# Patient Record
Sex: MALE | Race: BLACK OR AFRICAN AMERICAN
[De-identification: names, ages, dates, MRNs, and addresses within clinical notes are randomized per-mention and may not be internally consistent; named-entity substitution may affect disease eponyms.]

---

## 2021-10-04 ENCOUNTER — HOSPITAL ENCOUNTER (EMERGENCY)
Dept: HOSPITAL 97 - ER | Age: 36
Discharge: HOME | End: 2021-10-04
Payer: OTHER GOVERNMENT

## 2021-10-04 VITALS — OXYGEN SATURATION: 96 % | DIASTOLIC BLOOD PRESSURE: 57 MMHG | SYSTOLIC BLOOD PRESSURE: 113 MMHG

## 2021-10-04 VITALS — TEMPERATURE: 98.3 F

## 2021-10-04 DIAGNOSIS — I10: ICD-10-CM

## 2021-10-04 DIAGNOSIS — R06.2: ICD-10-CM

## 2021-10-04 DIAGNOSIS — Z20.822: ICD-10-CM

## 2021-10-04 DIAGNOSIS — J06.9: Primary | ICD-10-CM

## 2021-10-04 LAB
BUN BLD-MCNC: 9 MG/DL (ref 7–18)
CRP SERPL-MCNC: 20.8 MG/L (ref ?–3)
FERRITIN SERPL-MCNC: 645.5 NG/ML (ref 26–388)
GLUCOSE SERPLBLD-MCNC: 106 MG/DL (ref 74–106)
HCT VFR BLD CALC: 38.4 % (ref 39.6–49)
INR BLD: 1.05
LYMPHOCYTES # SPEC AUTO: 3.1 K/UL (ref 0.7–4.9)
PMV BLD: 7.9 FL (ref 7.6–11.3)
POTASSIUM SERPL-SCNC: 3.4 MMOL/L (ref 3.5–5.1)
RBC # BLD: 4.78 M/UL (ref 4.33–5.43)
TROPONIN (EMERG DEPT USE ONLY): 0.04 NG/ML (ref 0–0.04)

## 2021-10-04 PROCEDURE — 84484 ASSAY OF TROPONIN QUANT: CPT

## 2021-10-04 PROCEDURE — 87081 CULTURE SCREEN ONLY: CPT

## 2021-10-04 PROCEDURE — 84145 PROCALCITONIN (PCT): CPT

## 2021-10-04 PROCEDURE — 83605 ASSAY OF LACTIC ACID: CPT

## 2021-10-04 PROCEDURE — 82728 ASSAY OF FERRITIN: CPT

## 2021-10-04 PROCEDURE — 87070 CULTURE OTHR SPECIMN AEROBIC: CPT

## 2021-10-04 PROCEDURE — 87804 INFLUENZA ASSAY W/OPTIC: CPT

## 2021-10-04 PROCEDURE — 36415 COLL VENOUS BLD VENIPUNCTURE: CPT

## 2021-10-04 PROCEDURE — 86140 C-REACTIVE PROTEIN: CPT

## 2021-10-04 PROCEDURE — 99284 EMERGENCY DEPT VISIT MOD MDM: CPT

## 2021-10-04 PROCEDURE — 85730 THROMBOPLASTIN TIME PARTIAL: CPT

## 2021-10-04 PROCEDURE — 85025 COMPLETE CBC W/AUTO DIFF WBC: CPT

## 2021-10-04 PROCEDURE — 85610 PROTHROMBIN TIME: CPT

## 2021-10-04 PROCEDURE — 87040 BLOOD CULTURE FOR BACTERIA: CPT

## 2021-10-04 PROCEDURE — 80048 BASIC METABOLIC PNL TOTAL CA: CPT

## 2021-10-04 PROCEDURE — 93005 ELECTROCARDIOGRAM TRACING: CPT

## 2021-10-04 PROCEDURE — 71045 X-RAY EXAM CHEST 1 VIEW: CPT

## 2021-10-04 NOTE — RAD REPORT
EXAM DESCRIPTION:  Jasson Single View10/4/2021 7:49 am

 

CLINICAL HISTORY:  Cough

 

COMPARISON:  none

 

FINDINGS:   The lungs appear clear of acute infiltrate. The heart is normal size

 

IMPRESSION:   No acute abnormalities displayed

## 2021-10-04 NOTE — EDPHYS
Physician Documentation                                                                           

 [facility]                                                                 

Name: Jason Mireles                                                                              

Age: 35 yrs                                                                                       

Sex: Male                                                                                         

: 1985                                                                                   

MRN: [id number]                                                                                   

Arrival Date: 10/04/2021                                                                          

Time: 07:15                                                                                       

Account#: [id number]                                                                            

Bed 5                                                                                             

Private MD:                                                                                       

ED Physician Dion Hollis                                                                         

HPI:                                                                                              

10/04                                                                                             

07:30 This 35 yrs old Black Male presents to ER via EMS with complaints of Shortness of       rn  

      breath.                                                                                     

07:30 The patient has shortness of breath at rest, with light activity. Onset: The            rn  

      symptoms/episode began/occurred 2 day(s) ago. Duration: The symptoms are intermittent.      

      The patient's shortness of breath is aggravated by exertion, light activity, is             

      alleviated by nothing. Associated signs and symptoms: Pertinent positives:                  

      non-productive cough, Pertinent negatives: chest pain, fever, hemoptysis. Severity of       

      symptoms: At their worst the symptoms were mild in the emergency department the             

      symptoms are unchanged. The patient has not experienced similar symptoms in the past.       

      The patient has not recently seen a physician. and nasal congestion for 2 days, sore        

      throat, shortness of breath that is worse with exertion. No fever. Not Covid                

      vaccinated. No known sick contacts. Is an active smoker. Denies hemoptysis or chest         

      pain. Also with mild diarrhea.                                                              

                                                                                                  

Historical:                                                                                       

- Allergies:                                                                                      

07:18 No Known Allergies;                                                                     ap3 

- Home Meds:                                                                                      

07:18 Lisinopril Oral [Active]; Hydrochlorothiazide Oral [Active]; Allopurinol Oral [Active]; ap3 

- PMHx:                                                                                           

07:18 Gout; Hypertensive disorder;                                                            ap3 

                                                                                                  

- Immunization history:: Adult Immunizations up to date.                                          

- Social history:: Smoking status: Patient reports the use of cigarette tobacco                   

  products, denies chronic smoking, but will smoke occasionally.                                  

- Family history:: not pertinent.                                                                 

- Hospitalizations: : No recent hospitalization is reported.                                      

                                                                                                  

                                                                                                  

ROS:                                                                                              

07:30 Constitutional: Negative for fever, chills, and weight loss, Eyes: Negative for injury, rn  

      pain, redness, and discharge, ENT: Positive for nasal congestion and sore throat Neck:      

      Negative for injury, pain, and swelling, Cardiovascular: Negative for chest pain,           

      palpitations, and edema, Respiratory: Positive for cough and shortness of breath            

      Abdomen/GI: Positive for diarrhea, negative for abdominal pain : Negative for injury,     

      bleeding, discharge, and swelling, MS/Extremity: Negative for injury and deformity,         

      Skin: Negative for injury, rash, and discoloration, Neuro: Negative for headache,           

      weakness, numbness, tingling, and seizure.                                                  

07:30 All other systems are negative.                                                             

                                                                                                  

Exam:                                                                                             

07:30 Constitutional:  Overweight male with audible wheezing Head/Face:  Normocephalic,       rn  

      atraumatic. Eyes:  Periorbital areas with no swelling, redness, or edema. ENT:  no          

      stridor Neck:  Trachea midline, no crepitus, no masses, full range of motion without        

      nuchal rigidity, or vertebral point tenderness.  No Meningismus. Cardiovascular:            

      Regular rate and rhythm.  No pulse deficits. Respiratory:  Mild tachypnea with audible      

      wheezing bilaterally Abdomen/GI:  Soft, non-tender Skin:  Warm, dry, no cyanosis MS/        

      Extremity:  Pulses equal, no cyanosis.  Neurovascular intact.  Full, normal range of        

      motion.  Equal circumference. Neuro:  Awake and alert, GCS 15, oriented to person,          

      place, time, and situation.  Cranial nerves II-XII grossly intact.  Motor strength 5/5      

      in all extremities.  Sensory grossly intact.  Cerebellar exam normal.  Normal gait.         

07:45 ECG was reviewed by the Attending Physician.                                            rn  

                                                                                                  

Vital Signs:                                                                                      

07:15  / 91; Pulse 85; Resp 18; Temp 98.3(O); Pulse Ox 95% ; Weight 142.88 kg; Height 6 ap3 

      ft. 1 in. (185.42 cm);                                                                      

08:14  / 75; Pulse 103; Resp 18; Pulse Ox 99% on R/A;                                   ap3 

08:57  / 57; Pulse 78; Resp 17; Pulse Ox 96% on R/A;                                    ap3 

07:15 Body Mass Index 41.56 (142.88 kg, 185.42 cm)                                            ap3 

                                                                                                  

MDM:                                                                                              

07:15 Patient medically screened.                                                             rn  

08:41 Data interpreted: Cardiac monitor: rate is 102 beats/min, rhythm is sinus tachycardia,  rn  

      with no ectopy, Interpretation: tachycardia, Pulse oximetry: on room air is 99 %.           

      Interpretation: normal. Test interpretation: by ED physician or midlevel provider: ECG,     

      plain radiologic studies, Chest x-ray negative for acute pneumonia or pneumothorax.         

09:22 Differential diagnosis: Bronchitis Myocardial Infarction pneumonia, Pneumothorax        rn  

      pulmonary edema, reactive airway disease, COVID, Flu, strep. Data reviewed: vital           

      signs, nurses notes, lab test result(s), EKG, radiologic studies, plain films, and as a     

      result, I will discharge patient. Counseling: I had a detailed discussion with the          

      patient and/or guardian regarding: the historical points, exam findings, and any            

      diagnostic results supporting the discharge/admit diagnosis, lab results, radiology         

      results, the need for outpatient follow up, to return to the emergency department if        

      symptoms worsen or persist or if there are any questions or concerns that arise at          

      home. Response to treatment: the patient's symptoms have markedly improved after            

      treatment, and as a result, I will discharge patient. Special discussion: I discussed       

      with the patient/guardian in detail that at this point there is no indication for           

      admission to the hospital. It is understood, however, that if the symptoms persist or       

      worsen the patient needs to return immediately for re-evaluation. ED course: Patient        

      improved, no oxygen requirement. Chest x-ray negative for pneumonia. Covid/flu/strep        

      all negative. States breathing is much improved. Will DC home with                          

      antibiotics/inhaler/steroids..                                                              

                                                                                                  

10/04                                                                                             

07:16 Order name: BMP                                                                         rn  

10/04                                                                                             

07:16 Order name: Blood Culture Adult (2)                                                     rn  

10/04                                                                                             

07:16 Order name: C-Reactive Protein                                                          rn  

10/04                                                                                             

07:16 Order name: CBC with Diff                                                               rn  

10/04                                                                                             

07:16 Order name: Ferritin; Complete Time: 08:19                                              rn  

10/04                                                                                             

07:16 Order name: Flu; Complete Time: 08:41                                                   rn  

10/04                                                                                             

07:16 Order name: Lactate; Complete Time: 08:                                               rn  

10/04                                                                                             

07:16 Order name: PT-INR; Complete Time: 08:19                                                rn  

10/04                                                                                             

07:16 Order name: Procalcitonin; Complete Time: 08:19                                         rn  

10/04                                                                                             

07:16 Order name: Ptt, Activated; Complete Time: 08:19                                        rn  

10/04                                                                                             

07:16 Order name: Strep; Complete Time: 08:41                                                 rn  

10/04                                                                                             

07:16 Order name: Troponin (emerg Dept Use Only); Complete Time: 08:19                        rn  

10/04                                                                                             

07:16 Order name: Basic Metabolic Panel; Complete Time: 08:19                                 EDMS

10/04                                                                                             

07:16 Order name: CXR XRAY; Complete Time: 08:41                                              rn  

10/04                                                                                             

07:16 Order name: EKG; Complete Time: 07:17                                                   rn  

10/04                                                                                             

07:16 Order name: Cardiac monitoring; Complete Time: 07:36                                    rn  

10/04                                                                                             

07:16 Order name: Droplet/Contact Precautions; Complete Time: 07:19                           rn  

10/04                                                                                             

07:16 Order name: EKG - Nurse/Tech; Complete Time: 07:36                                      rn  

10/04                                                                                             

07:16 Order name: IV Start; Complete Time: 07:19                                              rn  

10/04                                                                                             

07:16 Order name: Labs collected and sent; Complete Time: 07:19                               rn  

10/04                                                                                             

07:16 Order name: O2 Per Protocol; Complete Time: 07:19                                       rn  

10/04                                                                                             

07:16 Order name: O2 Sat Monitoring; Complete Time: 07:19                                     rn  

10/04                                                                                             

07:16 Order name: Blood Culture                                                               EDMS

10/04                                                                                             

07:16 Order name: C-Reactive Protein; Complete Time: 08:19                                    EDMS

10/04                                                                                             

07:16 Order name: CBC with Automated Diff; Complete Time: 08:19                               EDMS

10/04                                                                                             

08:27 Order name: Throat Culture                                                              EDMS

10/04                                                                                             

08:43 Order name: SARS-COV-2 RT PCR; Complete Time: 08:47                                     EDMS

                                                                                                  

EC:45 Rate is 74 beats/min. Rhythm is regular. QRS Axis is Normal. HI interval is normal. QRS rn  

      interval is normal. QT interval is normal. No Q waves. T waves are Normal. No ST            

      changes noted. Clinical impression: Normal ECG. Interpreted by me. Reviewed by me.          

                                                                                                  

Administered Medications:                                                                         

07:47 Drug: Xopenex (levalbuterol) (3) 1.25 mg Route: Inhalation;                             ll1 

                                                                                                  

                                                                                                  

Disposition Summary:                                                                              

10/04/21 09:24                                                                                    

Discharge Ordered                                                                                 

      Location: Home                                                                          rn  

      Problem: new                                                                            rn  

      Symptoms: have improved                                                                 rn  

      Condition: Stable                                                                       rn  

      Diagnosis                                                                                   

        - Acute upper respiratory infection, unspecified                                      rn  

        - Wheezing                                                                            rn  

      Followup:                                                                               rn  

        - With: Private Physician                                                                  

        - When: As needed                                                                          

        - Reason: Recheck today's complaints, Re-evaluation by your physician                      

      Discharge Instructions:                                                                     

        - Steps to Quit Smoking                                                               rn  

        - Health Risks of Smoking                                                             rn  

        - Discharge Summary Sheet                                                             ll1 

        - Upper Respiratory Infection, Adult                                                  rn  

      Forms:                                                                                      

        - Work release form                                                                   ll1 

        - Medication Reconciliation Form                                                      rn  

        - Thank You Letter                                                                    rn  

        - Antibiotic Education                                                                rn  

        - Prescription Opioid Use                                                             rn  

      Prescriptions:                                                                              

        - albuterol sulfate 90 mcg/actuation Inhalation HFA aerosol inhaler                        

            - inhale 2 puff by INHALATION route every 4-6 hours; 1 Pump; Refills: 0, Product  rn  

      Selection Permitted                                                                         

        - Prednisone 20 mg Oral Tablet                                                             

            - take 3 tablets by ORAL route once daily for 5 days; 15 tablet; Refills: 0,      rn  

      Product Selection Permitted                                                                 

        - Zithromax Z-Sukh 250 mg Oral Tablet                                                       

            - take 1 tablet by ORAL route as directed for 5 days Day 1 - take two (2) tablets rn  

      one time.  Day 2, 3, 4 , 5 take one (1) tablet once daily.; 6 tablet; Refills: 0,           

      Product Selection Permitted                                                                 

Signatures:                                                                                       

Dispatcher [employer]                           EDMS                                                 

Dion Hollis MD MD rn Prokisch, Amanda, RN                    RN   ap3                                                  

Chanell Joyce RN                       RN   ll1                                                  

                                                                                                  

Corrections: (The following items were deleted from the chart)                                    

07:51 07:17 CORONAVIRUS+MR.LAB.BRZ ordered. [facility]                                              EDMS

                                                                                                  

**************************************************************************************************

## 2021-10-04 NOTE — ER
Nurse's Notes                                                                                     

 [facility]                                                                 

Name: Jason Mireles                                                                              

Age: 35 yrs                                                                                       

Sex: Male                                                                                         

: 1985                                                                                   

MRN: [id number]                                                                                   

Arrival Date: 10/04/2021                                                                          

Time: 07:15                                                                                       

Account#: [id number]                                                                            

Bed 5                                                                                             

Private MD:                                                                                       

Diagnosis: Acute upper respiratory infection, unspecified;Wheezing                                

                                                                                                  

Presentation:                                                                                     

10/04                                                                                             

07:15 Chief complaint: EMS states: [place] [facility].        ap3 

      Patient is complaining of a cough for a couple days, but feels more tightness in his        

      throat. Coronavirus screen: Client presents with at least one sign or symptom that may      

      indicate coronavirus-19. Standard/surgical mask placed on the client. Ebola Screen: No      

      symptoms or risks identified at this time. Initial Sepsis Screen: Does the patient meet     

      any 2 criteria? No. Patient's initial sepsis screen is negative. Does the patient have      

      a suspected source of infection? No. Patient's initial sepsis screen is negative. Risk      

      Assessment: Do you want to hurt yourself or someone else? Patient reports no desire to      

      harm self or others. Onset of symptoms was 2021. Care prior to arrival:       

      Medication(s) given: 125 Solumedrol IV initiated. 20 GA, in the right hand.                 

07:15 Method Of Arrival: EMS: [facility]                                                      ap3 

07:15 Acuity: SIRISHA 3                                                                           ap3 

                                                                                                  

Triage Assessment:                                                                                

07:17 General: Appears in no apparent distress. Behavior is calm, cooperative, appropriate    ap3 

      for age. Pain: Denies pain. Neuro: Level of Consciousness is awake, alert, obeys            

      commands, Oriented to person, place, time, situation, Gait is steady, Speech is normal.     

      Cardiovascular: Patient's skin is warm and dry. Respiratory: Airway is patent Breath        

      sounds with wheezes bilaterally. Parent/caregiver reports the patient having shortness      

      of breath pain with cough. GI: No signs and/or symptoms were reported involving the         

      gastrointestinal system. : No signs and/or symptoms were reported regarding the           

      genitourinary system. Derm: No signs and/or symptoms reported regarding the                 

      dermatologic system.                                                                        

                                                                                                  

Historical:                                                                                       

- Allergies:                                                                                      

07:18 No Known Allergies;                                                                     ap3 

- Home Meds:                                                                                      

07:18 Lisinopril Oral [Active]; Hydrochlorothiazide Oral [Active]; Allopurinol Oral [Active]; ap3 

- PMHx:                                                                                           

07:18 Gout; Hypertensive disorder;                                                            ap3 

                                                                                                  

- Immunization history:: Adult Immunizations up to date.                                          

- Social history:: Smoking status: Patient reports the use of cigarette tobacco                   

  products, denies chronic smoking, but will smoke occasionally.                                  

- Family history:: not pertinent.                                                                 

- Hospitalizations: : No recent hospitalization is reported.                                      

                                                                                                  

                                                                                                  

Screenin:19 Abuse screen: Denies threats or abuse. Nutritional screening: No deficits noted.        ap3 

      Tuberculosis screening: No symptoms or risk factors identified. Fall Risk None              

      identified.                                                                                 

                                                                                                  

Assessment:                                                                                       

08:58 Reassessment: Patient and/or family updated on plan of care and expected duration. Pain ap3 

      level reassessed. Patient is alert, oriented x 3, equal unlabored respirations, skin        

      warm/dry/pink.                                                                              

                                                                                                  

Vital Signs:                                                                                      

07:15  / 91; Pulse 85; Resp 18; Temp 98.3(O); Pulse Ox 95% ; Weight 142.88 kg; Height 6 ap3 

      ft. 1 in. (185.42 cm);                                                                      

08:14  / 75; Pulse 103; Resp 18; Pulse Ox 99% on R/A;                                   ap3 

08:57  / 57; Pulse 78; Resp 17; Pulse Ox 96% on R/A;                                    ap3 

07:15 Body Mass Index 41.56 (142.88 kg, 185.42 cm)                                            ap3 

                                                                                                  

ED Course:                                                                                        

07:15 Patient arrived in ED.                                                                  ap3 

07:15 Dion Hollis MD is Attending Physician.                                                rn  

07:17 Triage completed.                                                                       ap3 

07:19 Amelia Gramajo, RN is Primary Nurse.                                                  ap3 

07:19 Arm band placed on right wrist.                                                         ap3 

07:19 Patient has correct armband on for positive identification. Bed in low position. Call   ap3 

      light in reach. Side rails up X2. Pulse ox on. NIBP on. Door closed. Noise minimized.       

07:39 EKG done, COVID swab sent to lab. Flu and/or RSV swab sent to lab. Strep swab sent to   ap3 

      lab.                                                                                        

07:48 CXR XRAY In Process Unspecified.                                                        EDMS

08:14 Blood Culture Sent.                                                                     mh5 

08:14 BMP Sent.                                                                               mh5 

08:14 Blood Culture Adult (2) Sent.                                                           mh5 

08:14 C-Reactive Protein Sent.                                                                mh5 

08:14 CBC with Diff Sent.                                                                     mh5 

08:14 Flu Sent.                                                                               mh5 

08:14 Strep Sent.                                                                             5 

09:39 No provider procedures requiring assistance completed. IV discontinued, intact,         ap3 

      bleeding controlled, No redness/swelling at site. Pressure dressing applied.                

                                                                                                  

Administered Medications:                                                                         

07:47 Drug: Xopenex (levalbuterol) (3) 1.25 mg Route: Inhalation;                             ll1 

                                                                                                  

                                                                                                  

Outcome:                                                                                          

09:24 Discharge ordered by MD.                                                                rn  

09:39 Discharged to home ambulatory.                                                          ap3 

09:39 Condition: good                                                                             

09:39 Discharge instructions given to patient, Instructed on discharge instructions, follow       

      up and referral plans. Prescriptions given X 3.                                             

09:39 Patient left the ED.                                                                    ap3 

                                                                                                  

Signatures:                                                                                       

Dispatcher [employer]                           EDMS                                                 

Dion Hollis MD MD rn Martinez, Maria                              5                                                  

Amelia Gramajo RN                    RN   ap3                                                  

Chanell Joyce RN                       RN   ll1                                                  

                                                                                                  

**************************************************************************************************

## 2021-10-05 NOTE — EKG
Test Date:    2021-10-04               Test Time:    07:34:21

Technician:   BRY                                    

                                                     

MEASUREMENT RESULTS:                                       

Intervals:                                           

Rate:         74                                     

ME:           156                                    

QRSD:         82                                     

QT:           404                                    

QTc:          448                                    

Axis:                                                

P:            75                                     

ME:           156                                    

QRS:          83                                     

T:            43                                     

                                                     

INTERPRETIVE STATEMENTS:                                       

                                                     

Normal sinus rhythm

Normal ECG

No previous ECG available for comparison



Electronically Signed On 10-05-21 18:06:17 CDT by French Carreon